# Patient Record
(demographics unavailable — no encounter records)

---

## 2025-05-22 NOTE — HISTORY OF PRESENT ILLNESS
[No Pertinent Cardiac History] : no history of aortic stenosis, atrial fibrillation, coronary artery disease, recent myocardial infarction, or implantable device/pacemaker [No Pertinent Pulmonary History] : no history of asthma, COPD, sleep apnea, or smoking [No Adverse Anesthesia Reaction] : no adverse anesthesia reaction in self or family member [(Patient denies any chest pain, claudication, dyspnea on exertion, orthopnea, palpitations or syncope)] : Patient denies any chest pain, claudication, dyspnea on exertion, orthopnea, palpitations or syncope [Moderate (4-6 METs)] : Moderate (4-6 METs) [Aortic Stenosis] : no aortic stenosis [Atrial Fibrillation] : no atrial fibrillation [Coronary Artery Disease] : no coronary artery disease [Recent Myocardial Infarction] : no recent myocardial infarction [Implantable Device/Pacemaker] : no implantable device/pacemaker [Asthma] : no asthma [COPD] : no COPD [Sleep Apnea] : no sleep apnea [Smoker] : not a smoker [Family Member] : no family member with adverse anesthesia reaction/sudden death [Self] : no previous adverse anesthesia reaction [Chronic Kidney Disease] : no chronic kidney disease [Diabetes] : no diabetes [FreeTextEntry1] : lower lid blepharoplasty [FreeTextEntry2] : 06/25/25 [FreeTextEntry3] : Dr John Paul Griffin [FreeTextEntry4] : 61 yr old female here for pre op evaluation for above mentioned procedure. She feels well. She is on compounded Semaglutide for weight loss, which she has discontinued as of last week.

## 2025-05-22 NOTE — ASSESSMENT
[FreeTextEntry4] : 61 yr old female here for pre op evaluation. EKG NSR. Labs ordered. Advised to hold GLP1RA 2 weeks prior to procedure. Pending labs will complete pre op.

## 2025-05-22 NOTE — ADDENDUM
ABHISHEK   FreeTextEntry1] : Patient optimized for surgery.